# Patient Record
Sex: FEMALE
[De-identification: names, ages, dates, MRNs, and addresses within clinical notes are randomized per-mention and may not be internally consistent; named-entity substitution may affect disease eponyms.]

---

## 2022-12-26 ENCOUNTER — NURSE TRIAGE (OUTPATIENT)
Dept: OTHER | Facility: CLINIC | Age: 22
End: 2022-12-26

## 2022-12-26 NOTE — TELEPHONE ENCOUNTER
Location of patient: [place]    Subjective: Caller states \"I started with flu symptoms and I have a head cold now\"     Current Symptoms: head congestion, runny nose, sore throat    Onset: 5 day ago; worsening    Pain Severity: denies    Temperature: denies     What has been tried: Tamiflu    Recommended disposition: See PCP within 24 Hours    Care advice provided, patient verbalizes understanding; denies any other questions or concerns; instructed to call back for any new or worsening symptoms. Patient/caller agrees to follow-up with PCP     This triage is a result of a call to [address] [place] [address]. Please do not respond to the triage nurse through this encounter. Any subsequent communication should be directly with the patient. Reason for Disposition   [place]    Protocols used:  Influenza - Seasonal-ADULT-